# Patient Record
Sex: MALE | Race: WHITE | NOT HISPANIC OR LATINO | Employment: OTHER | URBAN - METROPOLITAN AREA
[De-identification: names, ages, dates, MRNs, and addresses within clinical notes are randomized per-mention and may not be internally consistent; named-entity substitution may affect disease eponyms.]

---

## 2022-04-13 ENCOUNTER — HOSPITAL ENCOUNTER (EMERGENCY)
Facility: CLINIC | Age: 57
Discharge: HOME | End: 2022-04-13
Attending: EMERGENCY MEDICINE | Admitting: EMERGENCY MEDICINE
Payer: COMMERCIAL

## 2022-04-13 VITALS
DIASTOLIC BLOOD PRESSURE: 100 MMHG | HEIGHT: 72 IN | SYSTOLIC BLOOD PRESSURE: 145 MMHG | HEART RATE: 95 BPM | TEMPERATURE: 98 F | RESPIRATION RATE: 18 BRPM | OXYGEN SATURATION: 97 % | WEIGHT: 240 LBS | BODY MASS INDEX: 32.51 KG/M2

## 2022-04-13 DIAGNOSIS — Z98.818 SURGICAL WOUND HEMORRHAGE AFTER DENTAL PROCEDURE: ICD-10-CM

## 2022-04-13 DIAGNOSIS — K91.840 SURGICAL WOUND HEMORRHAGE AFTER DENTAL PROCEDURE: ICD-10-CM

## 2022-04-13 PROCEDURE — 99283 EMERGENCY DEPT VISIT LOW MDM: CPT

## 2022-04-13 PROCEDURE — 250N000009 HC RX 250: Performed by: EMERGENCY MEDICINE

## 2022-04-13 RX ORDER — LIDOCAINE HYDROCHLORIDE AND EPINEPHRINE 10; 10 MG/ML; UG/ML
5 INJECTION, SOLUTION INFILTRATION; PERINEURAL ONCE
Status: COMPLETED | OUTPATIENT
Start: 2022-04-13 | End: 2022-04-13

## 2022-04-13 RX ORDER — TRANEXAMIC ACID 100 MG/ML
INJECTION, SOLUTION INTRAVENOUS ONCE
Status: COMPLETED | OUTPATIENT
Start: 2022-04-13 | End: 2022-04-13

## 2022-04-13 RX ADMIN — LIDOCAINE HYDROCHLORIDE AND EPINEPHRINE 5 ML: 10; 10 INJECTION, SOLUTION INFILTRATION; PERINEURAL at 06:49

## 2022-04-13 RX ADMIN — TRANEXAMIC ACID: 1 INJECTION, SOLUTION INTRAVENOUS at 06:48

## 2022-04-13 ASSESSMENT — ENCOUNTER SYMPTOMS: DIZZINESS: 0

## 2022-04-13 NOTE — ED PROVIDER NOTES
History   Chief Complaint:  Post-op Problem       HPI   Alonso Smith is a 57 year old male who presents with a dental problem. The patient had a tooth extracted yesterday morning due to an injury sustained while playing basketball. He reports that he woke up at 0200 with bleeding at the extraction site. He has tried pressure, ice, and a tea bag, which have not stopped the bleeding. He denies pain or dizziness. He is not on any blood thinners.    Review of Systems   HENT: Positive for dental problem.    Neurological: Negative for dizziness.   All other systems reviewed and are negative.    Allergies:  The patient has no known allergies.     Medications:  Lipitor    Past Medical History:     Hydrocele  Obesity    Past Surgical History:    Tooth extraction    Social History:  Patient presents alone    Physical Exam     Patient Vitals for the past 24 hrs:   BP Temp Temp src Pulse Resp SpO2 Height Weight   04/13/22 0413 (!) 172/98 98  F (36.7  C) Temporal 95 18 96 % 1.829 m (6') 108.9 kg (240 lb)       Physical Exam  Vitals and nursing note reviewed.   Constitutional:       General: He is not in acute distress.     Appearance: Normal appearance. He is not ill-appearing.   HENT:      Head: Normocephalic and atraumatic.      Right Ear: External ear normal.      Left Ear: External ear normal.      Nose: Nose normal.      Mouth/Throat:      Mouth: Mucous membranes are moist.     Eyes:      Extraocular Movements: Extraocular movements intact.      Conjunctiva/sclera: Conjunctivae normal.   Pulmonary:      Effort: Pulmonary effort is normal. No respiratory distress.   Musculoskeletal:         General: No deformity or signs of injury.      Cervical back: Normal range of motion and neck supple.   Skin:     General: Skin is warm and dry.      Findings: No rash.   Neurological:      Mental Status: He is alert and oriented to person, place, and time.   Psychiatric:         Mood and Affect: Mood normal.         Behavior:  Behavior normal.         Emergency Department Course     Emergency Department Course:       Reviewed:  I reviewed nursing notes, vitals, past medical history and Care Everywhere    Assessments:  0536 I obtained history and examined the patient as noted above.   06 I rechecked the patient.  06 I rechecked the patient.     Interventions:  0603 Lidocaine with epinephrine injection  0603 TXA topical  0630    Surgicel packing    Disposition:  The patient was discharged to home.     Impression & Plan     Medical Decision Makin yo M with post dental extraction bleeding.  No signs of sig blood loss.  Attempted hemostasis with lido/epi injection, followed by TXA soaked gauze, without resolution.  Surgicel was then packed into the area again without much improvement.  Will try topical thrombin as well and have him f/u this morning with his dentist.       0728  Patient does not want to wait any longer for thrombin to come from pharmacy.  Continues to have mild oozing. He will go directly to his dentist this morning.     Diagnosis:    ICD-10-CM    1. Surgical wound hemorrhage after dental procedure  K91.840     Z98.818        Discharge Medications:  New Prescriptions    No medications on file       Scribe Disclosure:  I, Minerva Irvin, am serving as a scribe at 5:19 AM on 2022 to document services personally performed by Deven Barry MD based on my observations and the provider's statements to me.          Deven Barry MD  [id number]

## 2022-04-13 NOTE — ED TRIAGE NOTES
Elbowed in mouth last week while playing basketball and chipped his front right tooth. Yesterday morning, patient had it extracted. Woke up couple hours with bleeding at the extraction site. ABCs intact.